# Patient Record
Sex: MALE | Race: WHITE | NOT HISPANIC OR LATINO | URBAN - NONMETROPOLITAN AREA
[De-identification: names, ages, dates, MRNs, and addresses within clinical notes are randomized per-mention and may not be internally consistent; named-entity substitution may affect disease eponyms.]

---

## 2017-10-06 ENCOUNTER — HISTORY (OUTPATIENT)
Dept: EMERGENCY MEDICINE | Facility: OTHER | Age: 77
End: 2017-10-06

## 2017-11-29 ENCOUNTER — HISTORY (OUTPATIENT)
Dept: MEDSURG UNIT | Facility: OTHER | Age: 77
End: 2017-11-29

## 2017-11-30 ENCOUNTER — TRANSFERRED RECORDS (OUTPATIENT)
Dept: HEALTH INFORMATION MANAGEMENT | Facility: CLINIC | Age: 77
End: 2017-11-30

## 2017-11-30 ENCOUNTER — MEDICAL CORRESPONDENCE (OUTPATIENT)
Facility: CLINIC | Age: 77
End: 2017-11-30
Payer: MEDICARE

## 2017-11-30 PROCEDURE — 93306 TTE W/DOPPLER COMPLETE: CPT | Mod: 26 | Performed by: INTERNAL MEDICINE

## 2017-12-01 ENCOUNTER — HISTORY (OUTPATIENT)
Dept: MEDSURG UNIT | Facility: OTHER | Age: 77
End: 2017-12-01

## 2017-12-02 ENCOUNTER — HISTORY (OUTPATIENT)
Dept: MEDSURG UNIT | Facility: OTHER | Age: 77
End: 2017-12-02

## 2017-12-03 ENCOUNTER — HISTORY (OUTPATIENT)
Dept: MEDSURG UNIT | Facility: OTHER | Age: 77
End: 2017-12-03

## 2017-12-06 ENCOUNTER — HISTORY (OUTPATIENT)
Dept: MEDSURG UNIT | Facility: OTHER | Age: 77
End: 2017-12-06

## 2017-12-08 ENCOUNTER — HISTORY (OUTPATIENT)
Dept: PEDIATRICS | Facility: OTHER | Age: 77
End: 2017-12-08

## 2017-12-08 ENCOUNTER — OFFICE VISIT - GICH (OUTPATIENT)
Dept: PEDIATRICS | Facility: OTHER | Age: 77
End: 2017-12-08

## 2017-12-08 DIAGNOSIS — N18.30 CHRONIC KIDNEY DISEASE, STAGE III (MODERATE) (H): ICD-10-CM

## 2017-12-08 DIAGNOSIS — I50.21 ACUTE SYSTOLIC HEART FAILURE (H): ICD-10-CM

## 2017-12-08 DIAGNOSIS — I50.20 SYSTOLIC CONGESTIVE HEART FAILURE (H): ICD-10-CM

## 2017-12-08 DIAGNOSIS — L03.116 CELLULITIS OF LEFT LOWER EXTREMITY: ICD-10-CM

## 2017-12-08 DIAGNOSIS — I48.19 PERSISTENT ATRIAL FIBRILLATION (H): ICD-10-CM

## 2017-12-08 DIAGNOSIS — M75.81 OTHER SHOULDER LESIONS, RIGHT SHOULDER: ICD-10-CM

## 2017-12-08 DIAGNOSIS — Z09 ENCOUNTER FOR FOLLOW-UP EXAMINATION AFTER COMPLETED TREATMENT FOR CONDITIONS OTHER THAN MALIGNANT NEOPLASM: ICD-10-CM

## 2017-12-14 ENCOUNTER — HOSPITAL ENCOUNTER (OUTPATIENT)
Dept: INFUSION THERAPY | Facility: OTHER | Age: 77
End: 2017-12-14
Attending: INTERNAL MEDICINE

## 2017-12-14 ENCOUNTER — HISTORY (OUTPATIENT)
Dept: INFUSION THERAPY | Facility: OTHER | Age: 77
End: 2017-12-14

## 2017-12-14 DIAGNOSIS — I48.19 PERSISTENT ATRIAL FIBRILLATION (H): ICD-10-CM

## 2017-12-14 DIAGNOSIS — I50.20 SYSTOLIC CONGESTIVE HEART FAILURE (H): ICD-10-CM

## 2017-12-14 LAB — POTASSIUM SERPL-SCNC: 4.5 MMOL/L (ref 3.5–5.1)

## 2017-12-19 ENCOUNTER — TRANSFERRED RECORDS (OUTPATIENT)
Dept: HEALTH INFORMATION MANAGEMENT | Facility: CLINIC | Age: 77
End: 2017-12-19

## 2017-12-19 ENCOUNTER — HISTORY (OUTPATIENT)
Dept: CARDIOLOGY | Facility: OTHER | Age: 77
End: 2017-12-19

## 2017-12-19 ENCOUNTER — OFFICE VISIT - GICH (OUTPATIENT)
Dept: CARDIOLOGY | Facility: OTHER | Age: 77
End: 2017-12-19

## 2017-12-19 ENCOUNTER — MEDICAL CORRESPONDENCE (OUTPATIENT)
Dept: CARDIOLOGY | Facility: CLINIC | Age: 77
End: 2017-12-19
Payer: MEDICARE

## 2017-12-19 DIAGNOSIS — I50.21 ACUTE SYSTOLIC HEART FAILURE (H): ICD-10-CM

## 2017-12-19 DIAGNOSIS — I45.10 RIGHT BUNDLE-BRANCH BLOCK: ICD-10-CM

## 2017-12-19 DIAGNOSIS — I50.20 SYSTOLIC CONGESTIVE HEART FAILURE (H): ICD-10-CM

## 2017-12-19 DIAGNOSIS — I48.19 PERSISTENT ATRIAL FIBRILLATION (H): ICD-10-CM

## 2017-12-19 DIAGNOSIS — N18.30 CHRONIC KIDNEY DISEASE, STAGE III (MODERATE) (H): ICD-10-CM

## 2017-12-19 DIAGNOSIS — I10 ESSENTIAL (PRIMARY) HYPERTENSION: ICD-10-CM

## 2017-12-19 DIAGNOSIS — I50.82 BIVENTRICULAR HEART FAILURE (H): ICD-10-CM

## 2017-12-19 DIAGNOSIS — D69.6 THROMBOCYTOPENIA (H): ICD-10-CM

## 2017-12-19 LAB — BNP SERPL-MCNC: 3356 PG/ML

## 2017-12-19 PROCEDURE — 99205 OFFICE O/P NEW HI 60 MIN: CPT | Mod: ZP | Performed by: INTERNAL MEDICINE

## 2017-12-26 ENCOUNTER — COMMUNICATION - GICH (OUTPATIENT)
Dept: CARDIAC REHAB | Facility: OTHER | Age: 77
End: 2017-12-26

## 2017-12-28 ENCOUNTER — COMMUNICATION - GICH (OUTPATIENT)
Dept: CARDIAC REHAB | Facility: OTHER | Age: 77
End: 2017-12-28

## 2017-12-28 ENCOUNTER — AMBULATORY - GICH (OUTPATIENT)
Dept: LAB | Facility: OTHER | Age: 77
End: 2017-12-28

## 2017-12-28 ENCOUNTER — HOSPITAL ENCOUNTER (OUTPATIENT)
Dept: RADIOLOGY | Facility: OTHER | Age: 77
End: 2017-12-28
Attending: INTERNAL MEDICINE

## 2017-12-28 DIAGNOSIS — D69.6 THROMBOCYTOPENIA (H): ICD-10-CM

## 2017-12-28 DIAGNOSIS — I48.19 PERSISTENT ATRIAL FIBRILLATION (H): ICD-10-CM

## 2017-12-28 DIAGNOSIS — I50.20 SYSTOLIC CONGESTIVE HEART FAILURE (H): ICD-10-CM

## 2017-12-28 DIAGNOSIS — N18.30 CHRONIC KIDNEY DISEASE, STAGE III (MODERATE) (H): ICD-10-CM

## 2017-12-28 LAB
ANION GAP - HISTORICAL: 7 (ref 5–18)
BNP SERPL-MCNC: 2389 PG/ML
BUN SERPL-MCNC: 24 MG/DL (ref 7–25)
BUN/CREAT RATIO - HISTORICAL: 23
CALCIUM SERPL-MCNC: 8.9 MG/DL (ref 8.6–10.3)
CHLORIDE SERPLBLD-SCNC: 107 MMOL/L (ref 98–107)
CO2 SERPL-SCNC: 24 MMOL/L (ref 21–31)
CREAT SERPL-MCNC: 1.05 MG/DL (ref 0.7–1.3)
ERYTHROCYTE [DISTWIDTH] IN BLOOD BY AUTOMATED COUNT: 18.8 % (ref 11.5–15.5)
GFR IF NOT AFRICAN AMERICAN - HISTORICAL: >60 ML/MIN/1.73M2
GLUCOSE SERPL-MCNC: 101 MG/DL (ref 70–105)
HCT VFR BLD AUTO: 39.2 % (ref 37–53)
HEMOGLOBIN: 12.5 G/DL (ref 13.5–17.5)
MCH RBC QN AUTO: 27.3 PG (ref 26–34)
MCHC RBC AUTO-ENTMCNC: 31.9 G/DL (ref 32–36)
MCV RBC AUTO: 86 FL (ref 80–100)
PLATELET # BLD AUTO: 115 THOU/CU MM (ref 140–440)
PMV BLD: 10.6 FL (ref 6.5–11)
POTASSIUM SERPL-SCNC: 4.5 MMOL/L (ref 3.5–5.1)
RED BLOOD COUNT - HISTORICAL: 4.58 MIL/CU MM (ref 4.3–5.9)
SODIUM SERPL-SCNC: 138 MMOL/L (ref 133–143)
WHITE BLOOD COUNT - HISTORICAL: 7.6 THOU/CU MM (ref 4.5–11)

## 2018-01-23 ENCOUNTER — HISTORY (OUTPATIENT)
Dept: CARDIOLOGY | Facility: OTHER | Age: 78
End: 2018-01-23

## 2018-01-23 ENCOUNTER — MEDICAL CORRESPONDENCE (OUTPATIENT)
Dept: CARDIOLOGY | Facility: CLINIC | Age: 78
End: 2018-01-23
Payer: MEDICARE

## 2018-01-23 ENCOUNTER — OFFICE VISIT - GICH (OUTPATIENT)
Dept: CARDIOLOGY | Facility: OTHER | Age: 78
End: 2018-01-23

## 2018-01-23 DIAGNOSIS — N18.30 CHRONIC KIDNEY DISEASE, STAGE III (MODERATE) (H): ICD-10-CM

## 2018-01-23 DIAGNOSIS — I10 ESSENTIAL (PRIMARY) HYPERTENSION: ICD-10-CM

## 2018-01-23 DIAGNOSIS — I45.10 RIGHT BUNDLE-BRANCH BLOCK: ICD-10-CM

## 2018-01-23 DIAGNOSIS — I50.21 ACUTE SYSTOLIC HEART FAILURE (H): ICD-10-CM

## 2018-01-23 DIAGNOSIS — I50.20 SYSTOLIC CONGESTIVE HEART FAILURE (H): ICD-10-CM

## 2018-01-23 DIAGNOSIS — I48.19 PERSISTENT ATRIAL FIBRILLATION (H): ICD-10-CM

## 2018-01-23 DIAGNOSIS — Z79.899 OTHER LONG TERM (CURRENT) DRUG THERAPY: ICD-10-CM

## 2018-01-23 DIAGNOSIS — I35.8 OTHER NONRHEUMATIC AORTIC VALVE DISORDERS: ICD-10-CM

## 2018-01-23 LAB
ALT (SGPT) - HISTORICAL: 4 IU/L (ref 7–52)
AST SERPL-CCNC: 17 IU/L (ref 13–39)
BNP SERPL-MCNC: 2374 PG/ML
TSH - HISTORICAL: 3.11 UIU/ML (ref 0.34–5.6)

## 2018-01-23 PROCEDURE — 99214 OFFICE O/P EST MOD 30 MIN: CPT | Mod: ZP | Performed by: INTERNAL MEDICINE

## 2018-01-25 ENCOUNTER — HOSPITAL ENCOUNTER (OUTPATIENT)
Dept: RESPIRATORY THERAPY | Facility: OTHER | Age: 78
End: 2018-01-25
Attending: INTERNAL MEDICINE

## 2018-01-25 DIAGNOSIS — Z79.899 OTHER LONG TERM (CURRENT) DRUG THERAPY: ICD-10-CM

## 2018-01-30 ENCOUNTER — HISTORY (OUTPATIENT)
Dept: INFUSION THERAPY | Facility: OTHER | Age: 78
End: 2018-01-30

## 2018-01-30 ENCOUNTER — HOSPITAL ENCOUNTER (OUTPATIENT)
Dept: INFUSION THERAPY | Facility: OTHER | Age: 78
End: 2018-01-30
Attending: INTERNAL MEDICINE

## 2018-01-30 LAB — POTASSIUM SERPL-SCNC: 4.2 MMOL/L (ref 3.5–5.1)

## 2018-02-09 VITALS
SYSTOLIC BLOOD PRESSURE: 100 MMHG | HEIGHT: 63 IN | TEMPERATURE: 98.3 F | DIASTOLIC BLOOD PRESSURE: 68 MMHG | HEART RATE: 90 BPM | WEIGHT: 145 LBS | BODY MASS INDEX: 25.69 KG/M2

## 2018-02-09 VITALS
WEIGHT: 149 LBS | HEART RATE: 72 BPM | SYSTOLIC BLOOD PRESSURE: 110 MMHG | DIASTOLIC BLOOD PRESSURE: 78 MMHG | DIASTOLIC BLOOD PRESSURE: 78 MMHG | WEIGHT: 137 LBS | BODY MASS INDEX: 25.21 KG/M2 | HEART RATE: 72 BPM | SYSTOLIC BLOOD PRESSURE: 128 MMHG | HEIGHT: 62 IN | BODY MASS INDEX: 27.42 KG/M2

## 2018-02-12 DIAGNOSIS — I48.91 ATRIAL FIBRILLATION, UNSPECIFIED TYPE (H): Primary | ICD-10-CM

## 2018-02-13 NOTE — PATIENT INSTRUCTIONS
Patient Information     Patient Name MRN Sex Toby lCay [id number] Male 1940      Patient Instructions by Ezra Arroyo MD at 2017  3:30 PM     Author:  Ezra Arroyo MD  Service:  (none) Author Type:  Physician     Filed:  2017  4:19 PM  Encounter Date:  2017 Status:  Addendum     :  Ezra Arroyo MD (Physician)        Related Notes: Original Note by Ezra Arroyo MD (Physician) filed at 2017  4:03 PM             -- Cardiology consult: Dr. López.   -- Daily weight   -- Call MD if weight increases by 3 lbs in one day or by 5 lb in a week   -- Elevate feet 4x/day above hip   -- Compression stockings AM to PM   -- Low salt diet   -- Limit fluid to 1500 mL/day   -- Bactrim x 10 days for left calf   -- Eat yogurt 1-2 times per day while on antibiotics (and for a few weeks after) to reduce the chances of diarrhea   -- Return in 7-10 days if rash is not resolved   -- Increase metoprolol to 25 mg   -- Schedule cardioversion   -- Schedule stress test   -- Have nursing call Ezra Arroyo MD with lightheaded/dizziness and would reduce lasix dose

## 2018-02-13 NOTE — TELEPHONE ENCOUNTER
Patient Information     Patient Name MRN Sex Toby Clay [id number] Male 1940      Telephone Encounter by Trista Hennessy RN at 2017  8:35 AM     Author:  Trista Hennessy RN Service:  (none) Author Type:  NURS- Registered Nurse     Filed:  2017  8:36 AM Encounter Date:  2017 Status:  Signed     :  Trista Hennessy RN (NURS- Registered Nurse)            Called to check on patient who had not showed up for stress test. No answer.

## 2018-02-13 NOTE — OR ANESTHESIA
Patient Information     Patient Name MRN Sex     Toby Meyer [id number] Male 1940      OR Anesthesia by Shonda Bravo CRNA at 2018  1:33 PM     Author:  Shonda Bravo CRNA Service:  (none) Author Type:  NURS- Nurse Anesthetist     Filed:  2018  1:33 PM Date of Service:  2018  1:33 PM Status:  Signed     :  Shonda Bravo CRNA (NURS- Nurse Anesthetist)            Anesthesia Post Operative Care Note    Name: Toby Meyer  MRN:   [id number]  :    1940       Procedure Done:  See Surgeon Note        Anesthesia Technique    Anesthetic Type:  MAC       MAC Type:  NC     Oral Trauma:  No    Intraoperative Course   Hemodynamics:  Stable    Ventilation Normal:  Yes Lung Sounds:  Normal      PACU Course        Nondepolarizer Used:       Reversed: N/A   Hemodynamics:  Stable      Hydration: Euvolemic   Temperature:  36.1 - 38.3      Mental Status:  Awake, alert, follows commands   Pain Management:  Adequate   Regional Block:  No   Anesthesia Complications:  None      Vital Signs:  Temp: 97.4  F (36.3  C)  Pulse: (!) 43  BP: (!) 88/57  Resp: 16  SpO2: 100 %    O2 Device: Room Air                  Active Lines:  Patient Lines/Drains/Airways Status    Active Line     Name: Placement date: Placement time: Site: Days:    PERIPHERAL VAD Right Antecubital 18 18   1300   Antecubital   less than 1                Intake & Output:       Labs:  No results for input(s): ZE1HAWGKEPA, HSM5FXRHDWHJ, PHARTERIAL, LBO8IFLSIUET, H6GQZRXZLXMH in the last 24 hours.    No results for input(s): MAGNESIUM in the last 24 hours.    No results for input(s): GLUCOSEMETER in the last 720 hours.        Shonda Bravo CRNA ....................  2018   1:33 PM

## 2018-02-13 NOTE — PATIENT INSTRUCTIONS
Patient Information     Patient Name MRN Sex Toby Clya [id number] Male 1940      Patient Instructions by Nayeli Villarreal RN at 2017  2:45 PM     Author:  Nayeli Villarreal RN Service:  (none) Author Type:  NURS- Registered Nurse     Filed:  2017  4:00 PM Encounter Date:  2017 Status:  Signed     :  Nayeli Villarreal RN (NURS- Registered Nurse)            Please weigh yourself every morning.  Keep a log of these weights.  Call if you gain more than 2 pounds in one day or 5 pounds in one week, have increased shortness of breath, or have increased swelling in your legs, feet, ankles, or belly.  [phone]    Start and maintain a low salt diet.  Keep your salt intake at 2,000 mg to 3,000 mg per day.    Limit fluid intake to no more than 1.5 liters per day    Increase lisinopril to 5 mg daily    Increase Lasix to 40 mg daily    Complete stress next next week    Repeat labs 1 -2 weeks    Please follow-up with cardiology in 1 month

## 2018-02-13 NOTE — OR ANESTHESIA
Patient Information     Patient Name MRN Sex Toby Clay [id number] Male 1940      OR Anesthesia by Shonda Bravo CRNA at 2018 12:53 PM     Author:  Shonda Bravo CRNA Service:  (none) Author Type:  NURS- Nurse Anesthetist     Filed:  2018 12:53 PM Date of Service:  2018 12:53 PM Status:  Signed     :  Shonda Bravo CRNA (NURS- Nurse Anesthetist)                                                           ANESTHESIA ASSESSMENT    Date: 18 Time: 12:53 PM      Patient:  Toby Meyer    * No surgery found *    Past Medical History:     Diagnosis  Date     Anasarca 2017     Aortic valve sclerosis 6/15/2017    S/p TTE 6/15/2017 [facility]       Ascites 2017    Moderate s/p Renal US       Atrial fibrillation (HC)     chronic anticoag with xarelto      Biventricular heart failure 6/15/2017    S/p TTE 6/15/2017: LVEF 25 % with diffuse hypokinesis; global RV fxn moderately reduced      CKD (chronic kidney disease) stage 3, GFR 30-59 ml/min      Essential hypertension 2013     Pneumonia 2013     RBBB (right bundle branch block) 2017     Systolic CHF, acute (HC) 2013    [facility] EF 20-25%       Systolic CHF, acute (HC) 2017    hosp'd [facility]      Systolic heart failure (HC)     EF 20-25% echo 13 at [facility]      [facility] (HC) 2017     Total bilirubin, elevated 2017       Past Surgical History:      Procedure  Laterality Date     CARDIOVERSION  13    successful       CARDIOVERSION  2017            SKIN BIOPSY  2013    nose         Family History      Problem  Relation Age of Onset     Good Health Mother      Good Health Father      Good Health Maternal Aunt      Good Health Paternal Aunt      Good Health Paternal Uncle      Good Health Maternal Uncle        Patient Active Problem List     Diagnosis  Code     VITALY (acute kidney injury)  (HC) N17.9     Systolic CHF, acute (HC) I50.21     Advance care planning Z71.89     Aortic valve sclerosis I35.8     Biventricular heart failure I50.82     Ascites R18.8     Hypermagnesemia E83.41     Total bilirubin, elevated R17     Thrombocytopenia (HC) D69.6     Elevated troponin I level R74.8     RBBB (right bundle branch block) I45.10     Bruising T14.8XXA     Anasarca R60.1     Atrial fibrillation (HC) I48.91     Mitral valve regurgitation I34.0     Tricuspid valve regurgitation I07.1     CKD (chronic kidney disease) stage 3, GFR 30-59 ml/min N18.3     HTN (hypertension) I10     Severe systolic congestive heart failure (HC) I50.20     On amiodarone therapy Z79.899         (Not in a hospital admission)    Allergies:No Known Allergies    Review of Systems:  GERD: No  Chest pain: No  Shortness of breath: No  Recent fever: No  Poor exercise tolerance: No  Bleeding tendency: Yes (on xarelto)  Pregnant: No  Anesthesia Complications: None      History    Smoking Status      Never Smoker   Smokeless Tobacco      Never Used     Social History     Social History        Marital status:       Spouse name: N/A     Number of children:  N/A     Years of education:  N/A     Social History Main Topics        Smoking status:  Never Smoker     Smokeless tobacco:  Never Used     Alcohol use  No     Drug use:  No     Sexual activity:  Yes     Partners: Female     Other Topics  Concern     Not on file      Social History Narrative     Born in [place], has been in the  for many years.  Jordanian is his first language.  , Sara.  7 sons and 2 daughters.  Moved to [place], [place] with his wife in 9/2017 to be closer to his daughter, Ja.    Moved from [place], [place] after 6 years and prior to that lived in [place], [place] for 6 years and prior to that [place].  Retired from construction.             Physical Examination:  /79  Pulse 75  Temp 96.4  F (35.8  C)  Resp 18  SpO2 97% There is no height or  weight on file to calculate BMI. There is no height or weight on file to calculate BSA.  Dental Condition: Good (upper denture in place)     Mallampati Score (Airway): II  Cardiovascular: Abnormal  (CHF, A-fib)  Pulmonary: Normal  Other: (not recorded)    Recent Labs in [facility]:    No results for input(s): SODIUM, POTASSIUM, CHLORIDE, IL8MJJQQ, ANIONGAP, BUN, CREATININE, BUNCREARATIO, CALCIUM, GLUCOSE, GLUCOSEMETER, KETONES, MAGNESIUM, WBC, HGB, HCT, PLT, ABORH, RHTYPE, PREGURINE, BHCGQL, HCGBETAQUANT, INR in the last 72 hours.          Assessment/Plan:  ASA Class: III  Risk of dental injury discussed: Yes  NPO status confirmed: Yes  Anesthetic Plan: MAC  Risk/Benefit/Alt discussed: Yes  Questions answered: Yes  Emergency Case?: No  Labs/ECG/Radiology Reviewed?: Yes      H&P Reviewed.  Patient Examined.      Provider Electronic Signature:  Shonda Bravo CRNA

## 2018-02-13 NOTE — H&P
Patient Information     Patient Name MRN Sex Toby Clay [id number] Male 1940      H&P by Ezra Arroyo MD at 2018  1:15 PM     Author:  Ezra Arroyo MD Service:  (none) Author Type:  Physician     Filed:  2018  1:15 PM Date of Service:  2018  1:15 PM Status:  Signed     :  Ezra Arroyo MD (Physician)            H&P Update:  I have personally reviewed the note from Dr. López from 18.  There have been no intervening changes.  He has been therapeutic on xarelto for > 3 weeks.    Problem list and home medication list reviewed today.    /82  Pulse 75  Temp 96.4  F (35.8  C)  Resp 18  SpO2 97%  Gen: Calm  CV: irregularly irregular, no m/r/g  Pulm: CTAB, no w/r/r  Neuro: A&Ox3    EKG  2018  Study personally reviewed  Atrial fibrillation, rate 75    POTASSIUM (mmol/L)    Date Value   2018 4.2       Assessment:  Atrial fibrillation    Plan:   -- Proceed with synchronized cardioversion    Signed, Ezra Arroyo MD  Internal Medicine & Pediatrics  Pager: [phone]

## 2018-02-13 NOTE — NURSING NOTE
Patient Information     Patient Name MRN Sex Toby Clay [id number] Male 1940      Nursing Note by Xena Morris at 2017  2:45 PM     Author:  Xena Morris Service:  (none) Author Type:  (none)     Filed:  2017  3:01 PM Encounter Date:  2017 Status:  Signed     :  Xena Morris            Patient comes in for consult on hospital follow up and acute systolic CHF.  Xena Morris LPN ....................  2017   2:57 PM

## 2018-02-13 NOTE — OR ANESTHESIA
Patient Information     Patient Name MRN Sex     Toyb Meyer [id number] Male 1940      OR Anesthesia by Wei Rios CRNA at 2017  1:30 PM     Author:  Wei Rios CRNA Service:  (none) Author Type:  NURS- Nurse Anesthetist     Filed:  2017  1:31 PM Date of Service:  2017  1:30 PM Status:  Signed     :  Wei Rios CRNA (NURS- Nurse Anesthetist)            Anesthesia Post Operative Care Note    Name: Toby Meyer  MRN:   [id number]  :    1940       Procedure Done:  See Surgeon Note   Case Cancelled for Anesthetic Reason:  No      Anesthesia Technique    Anesthetic Type:  MAC       MAC Type:  NC     Oral Trauma:  No    Intraoperative Course   Hemodynamics:  Stable    Ventilation Normal:  Yes Lung Sounds:  Normal      PACU Course        Nondepolarizer Used:       Reversed: N/A   Hemodynamics:  Stable      Hydration: Euvolemic   Temperature:  36.1 - 38.3      Mental Status:  Awake, alert, follows commands   Pain Management:  Adequate   Regional Block:  No   Anesthesia Complications:  None      Vital Signs:  Temp: 99.8  F (37.7  C)  Pulse: (!) 129  BP: (!) 119/111  Resp: 20  SpO2: 99 %    O2 Device: Room Air                  Active Lines:  Patient Lines/Drains/Airways Status    Active Line     Name: Placement date: Placement time: Site: Days:    PERIPHERAL VAD Right;Antecubital 20 13   0907      1637    PERIPHERAL VAD Left Hand 20 17   1244   Hand   less than 1                Intake & Output:       Labs:  No results for input(s): SY4MNBXOGOM, TBR6MGBZOCEB, PHARTERIAL, AFZ3PHIRJFVT, S2LHCAMJCOZN in the last 24 hours.    No results for input(s): MAGNESIUM in the last 24 hours.    No results for input(s): GLUCOSEMETER in the last 720 hours.        Wei Rios CRNA ....................  2017   1:30 PM

## 2018-02-13 NOTE — PROGRESS NOTES
Patient Information     Patient Name MRN Sex Toby Clay [id number] Male 1940      Progress Notes by Dylan López DO at 2017  2:45 PM     Author:  Dylan López DO Service:  (none) Author Type:  PHYS- Osteopathic     Filed:  2017  7:32 PM Encounter Date:  2017 Status:  Signed     :  Dylan López DO (PHYS- Osteopathic)            [facility] HEART CARE   CARDIOLOGY CONSULT    Toby Meyer    Ezra Arroyo MD    Chief Complaint     Patient presents with       Consult      hospital f/u, acute systolic CHF         HPI:      IMAGING RESULTS:  Mr. Meyer is a 77-year-old Eritrean/Malay gentleman who speaks partial Malay and at the times is difficult to understand. He is being seen for severe systolic heart failure. In addition, he has a history of atrial fibrillation, biventricular heart failure, chronic kidney disease stage III, hypertension, right bundle branch block, and anasarca.    He's had atrial fibrillation since approximately  but is unsure of the dates. He states he was on Coumadin for approximately one year but then was transitioned to Xarelto 15 mg daily with a creatinine clearance which is less than 50.  His creatinine clearance was 49 based on his last labs from 17 and 44 based on labs from 17. He has lived in multiple places including [place], [place], and [place]. He reports his dad was the  of [employer] but was killed/murdered. He has no ties to the company presently, if I understand him right. He moved to [place] as his significant other of 27 years combined daughter lives in [place].     He has had increasing swelling to his lower extremities over the last 3 months. The swelling is described as starting in his feet extending up his legs and into his abdomen. He was admitted to the hospital on 17 for an acute exacerbation of systolic heart failure. He had seen cardiology   2017 and was found to have an ejection fraction of 25% secondary to atrial fibrillation. He has been cardioverted in the past but was described as successful only for a short period of time.    He was originally seen by [facility] for shortness of breath. He did establish care with them on November 27, 2017 he was seen on November 28 and November 29. He was having a hard time laying flat in bed secondary shortness of breath. The swelling extended into his abdomen. He was transferred from [place] for an admission to [facility]. He was admitted to the hospital after initially trying torsemide and provided IV Lasix. He had minimal urine output. He is transitioning to furosemide and metolazone at which point an he started to diurese. He diuresed a total of 15 L and was down 20 pounds. His kidney function improved from 3.06 to 1.2. He was noted to be in atrial fibrillation. He has been at [facility] for physical therapy since discharge and per the patient, has plans for discharge tomorrow, 12/20/17.    He has since follow up with Dr. Arroyo and had a cardioversion on 12/14/17. His EKG today to continues to support sinus rhythm. It seems that his reduced EF is secondary to rate. He has been on Lasix 20 mg daily, lisinopril 2.5 mg daily, metoprolol 25 mg daily, and potassium 20 mEq.    He has an echo from 11/30/17 which shows severe hypokinesis, an EF of 5-10%, moderate mitral regurgitation, and moderate tricuspid regurgitation. His IVC was dilated at 2.6 cm and he was noted to be in atrial fibrillation. He has severe bilateral atrial enlargement. He had moderate to severe right ventricular hypertrophy.          PAST MEDICAL HISTORY:  Past Medical History:     Diagnosis  Date     Anasarca 11/29/2017     Aortic valve sclerosis 6/15/2017    S/p TTE 6/15/2017 [facility]       Ascites 11/29/2017    Moderate s/p Renal US       Atrial fibrillation (HC) 2013    chronic anticoag with xarelto      Biventricular  heart failure 6/15/2017    S/p TTE 6/15/2017: LVEF 25 % with diffuse hypokinesis; global RV fxn moderately reduced      CKD (chronic kidney disease) stage 3, GFR 30-59 ml/min 2013     Essential hypertension 2013     Pneumonia 4/21/2013     RBBB (right bundle branch block) 11/29/2017     Systolic CHF, acute (HC) 4/22/2013    [facility] heart and vascular [place] EF 20-25%       Systolic CHF, acute (HC) 11/29/2017    hosp'd GI      Systolic heart failure (HC) 2013    EF 20-25% echo 4/12/13 at [facility] (HC) 11/29/2017     Total bilirubin, elevated 11/29/2017       FAMILY HISTORY:  Family History      Problem  Relation Age of Onset     Good Health Mother      Good Health Father      Good Health Maternal Aunt      Good Health Paternal Aunt      Good Health Paternal Uncle      Good Health Maternal Uncle        PAST SURGICAL HISTORY:  Past Surgical History:      Procedure  Laterality Date     CARDIOVERSION  6/21/13    successful       CARDIOVERSION  12/14/2017            SKIN BIOPSY  9/2013    nose         SOCIAL HISTORY:  Social History     Social History        Marital status:       Spouse name: N/A     Number of children:  N/A     Years of education:  N/A     Social History Main Topics        Smoking status:  Never Smoker     Smokeless tobacco:  Never Used     Alcohol use  No     Drug use:  No     Sexual activity:  Yes     Partners: Female     Other Topics  Concern     None      Social History Narrative     Born in [place], has been in the [place] for many years.  Emirati is his first language.  , Asra.  7 sons and 2 daughters.  Moved to [place], [place] with his wife in 9/2017 to be closer to his daughter, Ja.    Moved from [place], [place] after 6 years and prior to that lived in [place], [place] for 6 years and prior to that [place].  Retired from construction.             CURRENT MEDICATIONS:  Current Outpatient Prescriptions on File Prior to Visit      "  Medication  Sig Dispense Refill     DME Compression stockings. Knee high. 20 mmHg. CHF. Lifelong. 2 Each 11     metoprolol succinate (TOPROL XL) 25 mg Sustained-Release tablet Take 1 tablet by mouth once daily. 90 tablet 4     multivitamin (MVI) tablet Take 1 tablet by mouth once daily. Indications: VITAMIN DEFICIENCY PREVENTION       polyethylene glycol (MIRALAX; GLYCOLAX) 17 g powder for solution Take 17 g by mouth once daily if needed for Constipation. 1 box 0     potassium chloride (K-DUR) 20 mEq Extended-Release tablet Take 1 tablet by mouth once daily with a meal. 30 tablet 11     rivaroxaban (XARELTO) 15 mg tab tablet Take 15 mg by mouth once daily with evening meal.       sennosides-docusate, 8.6-50 mg, (SENOKOT S) 8.6-50 mg tablet Take 1 tablet by mouth 2 times daily. 60 tablet 11     No current facility-administered medications on file prior to visit.        ALLERGIES:  No Known Allergies      ROS:  CONSTITUTIONAL:  No weight loss but with weight gain, fever, chills, he denies weakness or fatigue.  HEENT:  Eyes:  No visual changes. Ears, Nose, Throat:  No hearing loss, congestion or difficulty swallowing.   CARDIOVASCULAR:  No chest pain, chest pressure or chest discomfort. No palpitations but with improved but still moderate lower extremity edema.  RESPIRATORY:  He admits to shortness of breath with dyspnea upon exertion, but he denies a cough or sputum production.  GASTROINTESTINAL: No abdominal pain. No anorexia, nausea, vomiting or diarrhea.   NEUROLOGICAL:  No headache, lightheadedness, dizziness, syncope, ataxia or weakness.  HEMATOLOGIC:  No anemia, bleeding or bruising.  PSYCHIATRIC:  No history of depression or anxiety.  ENDOCRINOLOGIC:  No reports of sweating, cold or heat intolerance. No polyuria or polydipsia.  SKIN:  No abnormal rashes or itching.      PHYSICAL EXAM:  /78 (Cuff Site: Right Arm, Position: Sitting, Cuff Size: Adult Regular)  Pulse 72  Ht 1.57 m (5' 1.81\")  Wt 67.6 kg " (149 lb)  BMI 27.42 kg/m2  GENERAL: The patient is a well-developed, well-nourished, in no apparent distress. Alert and oriented x3.  HEENT: Head is normocephalic and atraumatic.   HEART: Regular rate and rhythm, S1S2 present without murmur, rub or gallop.  LUNGS: Respirations regular and unlabored.   GI: Abdomen is soft and nondistended. Minimally full abdomen  EXTREMITIES: 2/4 peripheral edema present.   MUSCULOSKELETAL: No joint swelling.  NEUROLOGIC: Alert and oriented X3. No focal neurologic deficits.   SKIN: No jaundice. No rashes or visible skin lesions present.      LAB RESULTS:  Hospital Outpatient Visit on 12/14/2017        Component  Date Value Ref Range Status     POTASSIUM 12/14/2017 4.5  3.5 - 5.1 mmol/L Final   Admission on 11/29/2017, Discharged on 12/07/2017        Component  Date Value Ref Range Status     SODIUM 11/29/2017 133  133 - 143 mmol/L Final     POTASSIUM 11/29/2017 4.0  3.5 - 5.1 mmol/L Final     CHLORIDE 11/29/2017 96* 98 - 107 mmol/L Final     CO2,TOTAL 11/29/2017 23  21 - 31 mmol/L Final     ANION GAP 11/29/2017 14  5 - 18                 Final     GLUCOSE 11/29/2017 102  70 - 105 mg/dL Final     CALCIUM 11/29/2017 9.3  8.6 - 10.3 mg/dL Final     BUN 11/29/2017 72* 7 - 25 mg/dL Final     CREATININE 11/29/2017 2.83* 0.70 - 1.30 mg/dL Final     BUN/CREAT RATIO           11/29/2017 25                  Final     GFR if  11/29/2017 26* >60 ml/min/1.73m2 Final     GFR if not  11/29/2017 22* >60 ml/min/1.73m2 Final     WHITE BLOOD COUNT         11/29/2017 9.3  4.5 - 11.0 thou/cu mm Final     RED BLOOD COUNT           11/29/2017 5.73  4.30 - 5.90 mil/cu mm Final     HEMOGLOBIN                11/29/2017 16.5  13.5 - 17.5 g/dL Final     HEMATOCRIT                11/29/2017 49.9  37.0 - 53.0 % Final     MCV                       11/29/2017 87  80 - 100 fL Final     MCH                       11/29/2017 28.8  26.0 - 34.0 pg Final     [facility]                       11/29/2017 33.1  32.0 - 36.0 g/dL Final     RDW                       11/29/2017 17.8* 11.5 - 15.5 % Final     PLATELET COUNT            11/29/2017 135* 140 - 440 thou/cu mm Final     MPV                       11/29/2017 13.1* 6.5 - 11.0 fL Final     MAGNESIUM 11/29/2017 2.9* 1.9 - 2.7 mg/dL Final     TROPONIN I 11/29/2017 0.071* <0.034 ng/mL Final     WHITE BLOOD COUNT         11/30/2017 8.0  4.5 - 11.0 thou/cu mm Final     RED BLOOD COUNT           11/30/2017 5.36  4.30 - 5.90 mil/cu mm Final     HEMOGLOBIN                11/30/2017 15.2  13.5 - 17.5 g/dL Final     HEMATOCRIT                11/30/2017 46.1  37.0 - 53.0 % Final     MCV                       11/30/2017 86  80 - 100 fL Final     MCH                       11/30/2017 28.4  26.0 - 34.0 pg Final     MCHC                      11/30/2017 33.0  32.0 - 36.0 g/dL Final     RDW                       11/30/2017 17.2* 11.5 - 15.5 % Final     PLATELET COUNT            11/30/2017 126* 140 - 440 thou/cu mm Final     MPV                       11/30/2017 13.4* 6.5 - 11.0 fL Final     SODIUM 11/30/2017 135  133 - 143 mmol/L Final     POTASSIUM 11/30/2017 4.4  3.5 - 5.1 mmol/L Final     CHLORIDE 11/30/2017 95* 98 - 107 mmol/L Final     CO2,TOTAL 11/30/2017 22  21 - 31 mmol/L Final     ANION GAP 11/30/2017 18  5 - 18                 Final     GLUCOSE 11/30/2017 79  70 - 105 mg/dL Final     CALCIUM 11/30/2017 8.9  8.6 - 10.3 mg/dL Final     BUN 11/30/2017 78* 7 - 25 mg/dL Final     CREATININE 11/30/2017 2.94* 0.70 - 1.30 mg/dL Final     BUN/CREAT RATIO           11/30/2017 27                  Final     GFR if  11/30/2017 25* >60 ml/min/1.73m2 Final     GFR if not  11/30/2017 21* >60 ml/min/1.73m2 Final     ALBUMIN 11/30/2017 3.4* 3.5 - 5.7 g/dL Final     PROTEIN,TOTAL 11/30/2017 5.3* 6.4 - 8.9 g/dL Final     GLOBULIN                  11/30/2017 1.9* 2.0 - 3.7 g/dL Final     A/G RATIO 11/30/2017 1.8  1.0 - 2.0                 Final      BILIRUBIN,TOTAL 11/30/2017 2.8* 0.3 - 1.0 mg/dL Final     ALK PHOSPHATASE 11/30/2017 89  34 - 104 IU/L Final     ALT (SGPT) 11/30/2017 7  7 - 52 IU/L Final     AST (SGOT) 11/30/2017 21  13 - 39 IU/L Final     TROPONIN I 11/30/2017 0.059* <0.034 ng/mL Final     MAGNESIUM 11/30/2017 2.8* 1.9 - 2.7 mg/dL Final     WHITE BLOOD COUNT         12/01/2017 8.7  4.5 - 11.0 thou/cu mm Final     RED BLOOD COUNT           12/01/2017 5.43  4.30 - 5.90 mil/cu mm Final     HEMOGLOBIN                12/01/2017 15.1  13.5 - 17.5 g/dL Final     HEMATOCRIT                12/01/2017 46.2  37.0 - 53.0 % Final     MCV                       12/01/2017 85  80 - 100 fL Final     MCH                       12/01/2017 27.8  26.0 - 34.0 pg Final     MCHC                      12/01/2017 32.7  32.0 - 36.0 g/dL Final     RDW                       12/01/2017 17.7* 11.5 - 15.5 % Final     PLATELET COUNT            12/01/2017 128* 140 - 440 thou/cu mm Final     MPV                       12/01/2017 13.5* 6.5 - 11.0 fL Final     SODIUM 12/01/2017 132* 133 - 143 mmol/L Final     POTASSIUM 12/01/2017 3.7  3.5 - 5.1 mmol/L Final     CHLORIDE 12/01/2017 92* 98 - 107 mmol/L Final     CO2,TOTAL 12/01/2017 23  21 - 31 mmol/L Final     ANION GAP 12/01/2017 17  5 - 18                 Final     GLUCOSE 12/01/2017 121* 70 - 105 mg/dL Final     CALCIUM 12/01/2017 8.6  8.6 - 10.3 mg/dL Final     BUN 12/01/2017 88* 7 - 25 mg/dL Final     CREATININE 12/01/2017 3.06* 0.70 - 1.30 mg/dL Final     BUN/CREAT RATIO           12/01/2017 29                  Final     GFR if  12/01/2017 24* >60 ml/min/1.73m2 Final     GFR if not  12/01/2017 20* >60 ml/min/1.73m2 Final     WHITE BLOOD COUNT         12/02/2017 8.3  4.5 - 11.0 thou/cu mm Final     RED BLOOD COUNT           12/02/2017 5.11  4.30 - 5.90 mil/cu mm Final     HEMOGLOBIN                12/02/2017 14.6  13.5 - 17.5 g/dL Final     HEMATOCRIT                12/02/2017 43.2  37.0 - 53.0 %  Final     MCV                       12/02/2017 85  80 - 100 fL Final     MCH                       12/02/2017 28.6  26.0 - 34.0 pg Final     MCHC                      12/02/2017 33.8  32.0 - 36.0 g/dL Final     RDW                       12/02/2017 17.1* 11.5 - 15.5 % Final     PLATELET COUNT            12/02/2017 114* 140 - 440 thou/cu mm Final     MPV                       12/02/2017 13.0* 6.5 - 11.0 fL Final     SODIUM 12/02/2017 136  133 - 143 mmol/L Final     POTASSIUM 12/02/2017 3.0* 3.5 - 5.1 mmol/L Final     CHLORIDE 12/02/2017 92* 98 - 107 mmol/L Final     CO2,TOTAL 12/02/2017 29  21 - 31 mmol/L Final     ANION GAP 12/02/2017 15  5 - 18                 Final     GLUCOSE 12/02/2017 123* 70 - 105 mg/dL Final     CALCIUM 12/02/2017 8.7  8.6 - 10.3 mg/dL Final     BUN 12/02/2017 90* 7 - 25 mg/dL Final     CREATININE 12/02/2017 2.74* 0.70 - 1.30 mg/dL Final     BUN/CREAT RATIO           12/02/2017 33                  Final     GFR if  12/02/2017 27* >60 ml/min/1.73m2 Final     GFR if not  12/02/2017 23* >60 ml/min/1.73m2 Final     MAGNESIUM 12/02/2017 2.6  1.9 - 2.7 mg/dL Final     TROPONIN I 12/02/2017 0.065* <0.034 ng/mL Final     MAGNESIUM 12/03/2017 2.4  1.9 - 2.7 mg/dL Final     SODIUM 12/03/2017 141  133 - 143 mmol/L Final     POTASSIUM 12/03/2017 2.7* 3.5 - 5.1 mmol/L Final     CHLORIDE 12/03/2017 92* 98 - 107 mmol/L Final     CO2,TOTAL 12/03/2017 33* 21 - 31 mmol/L Final     ANION GAP 12/03/2017 16  5 - 18                 Final     GLUCOSE 12/03/2017 109* 70 - 105 mg/dL Final     CALCIUM 12/03/2017 8.7  8.6 - 10.3 mg/dL Final     BUN 12/03/2017 81* 7 - 25 mg/dL Final     CREATININE 12/03/2017 2.31* 0.70 - 1.30 mg/dL Final     BUN/CREAT RATIO           12/03/2017 35                  Final     GFR if  12/03/2017 33* >60 ml/min/1.73m2 Final     GFR if not  12/03/2017 28* >60 ml/min/1.73m2 Final     SODIUM 12/04/2017 135  133 - 143 mmol/L Final      POTASSIUM 12/04/2017 2.7* 3.5 - 5.1 mmol/L Final     CHLORIDE 12/04/2017 88* 98 - 107 mmol/L Final     CO2,TOTAL 12/04/2017 33* 21 - 31 mmol/L Final     ANION GAP 12/04/2017 14  5 - 18                 Final     GLUCOSE 12/04/2017 223* 70 - 105 mg/dL Final     CALCIUM 12/04/2017 8.1* 8.6 - 10.3 mg/dL Final     BUN 12/04/2017 72* 7 - 25 mg/dL Final     CREATININE 12/04/2017 1.99* 0.70 - 1.30 mg/dL Final     BUN/CREAT RATIO           12/04/2017 36                  Final     GFR if  12/04/2017 40* >60 ml/min/1.73m2 Final     GFR if not  12/04/2017 33* >60 ml/min/1.73m2 Final     POTASSIUM 12/04/2017 3.3* 3.5 - 5.1 mmol/L Final     POTASSIUM 12/05/2017 3.2* 3.5 - 5.1 mmol/L Final     SODIUM 12/05/2017 141  133 - 143 mmol/L Final     POTASSIUM 12/05/2017 3.3* 3.5 - 5.1 mmol/L Final     CHLORIDE 12/05/2017 93* 98 - 107 mmol/L Final     CO2,TOTAL 12/05/2017 33* 21 - 31 mmol/L Final     ANION GAP 12/05/2017 15  5 - 18                 Final     GLUCOSE 12/05/2017 121* 70 - 105 mg/dL Final     CALCIUM 12/05/2017 8.2* 8.6 - 10.3 mg/dL Final     BUN 12/05/2017 60* 7 - 25 mg/dL Final     CREATININE 12/05/2017 1.57* 0.70 - 1.30 mg/dL Final     BUN/CREAT RATIO           12/05/2017 38                  Final     GFR if  12/05/2017 52* >60 ml/min/1.73m2 Final     GFR if not  12/05/2017 43* >60 ml/min/1.73m2 Final     WHITE BLOOD COUNT         12/06/2017 11.0  4.5 - 11.0 thou/cu mm Final     RED BLOOD COUNT           12/06/2017 4.94  4.30 - 5.90 mil/cu mm Final     HEMOGLOBIN                12/06/2017 14.0  13.5 - 17.5 g/dL Final     HEMATOCRIT                12/06/2017 42.7  37.0 - 53.0 % Final     MCV                       12/06/2017 86  80 - 100 fL Final     MCH                       12/06/2017 28.3  26.0 - 34.0 pg Final     MCHC                      12/06/2017 32.8  32.0 - 36.0 g/dL Final     RDW                       12/06/2017 16.9* 11.5 - 15.5 % Final      PLATELET COUNT            12/06/2017 108* 140 - 440 thou/cu mm Final     MPV                       12/06/2017 12.7* 6.5 - 11.0 fL Final     SODIUM 12/06/2017 135  133 - 143 mmol/L Final     POTASSIUM 12/06/2017 3.3* 3.5 - 5.1 mmol/L Final     CHLORIDE 12/06/2017 96* 98 - 107 mmol/L Final     CO2,TOTAL 12/06/2017 31  21 - 31 mmol/L Final     ANION GAP 12/06/2017 8  5 - 18                 Final     GLUCOSE 12/06/2017 100  70 - 105 mg/dL Final     CALCIUM 12/06/2017 7.6* 8.6 - 10.3 mg/dL Final     BUN 12/06/2017 52* 7 - 25 mg/dL Final     CREATININE 12/06/2017 1.35* 0.70 - 1.30 mg/dL Final     BUN/CREAT RATIO           12/06/2017 39                  Final     GFR if  12/06/2017 >60  >60 ml/min/1.73m2 Final     GFR if not  12/06/2017 51* >60 ml/min/1.73m2 Final     MAGNESIUM 12/06/2017 2.2  1.9 - 2.7 mg/dL Final     SODIUM 12/07/2017 134  133 - 143 mmol/L Final     POTASSIUM 12/07/2017 3.4* 3.5 - 5.1 mmol/L Final     CHLORIDE 12/07/2017 92* 98 - 107 mmol/L Final     CO2,TOTAL 12/07/2017 29  21 - 31 mmol/L Final     ANION GAP 12/07/2017 13  5 - 18                 Final     GLUCOSE 12/07/2017 106* 70 - 105 mg/dL Final     CALCIUM 12/07/2017 7.9* 8.6 - 10.3 mg/dL Final     BUN 12/07/2017 42* 7 - 25 mg/dL Final     CREATININE 12/07/2017 1.20  0.70 - 1.30 mg/dL Final     BUN/CREAT RATIO           12/07/2017 35                  Final     GFR if  12/07/2017 >60  >60 ml/min/1.73m2 Final     GFR if not  12/07/2017 59* >60 ml/min/1.73m2 Final     MAGNESIUM 12/07/2017 2.4  1.9 - 2.7 mg/dL Final   Admission on 10/06/2017, Discharged on 10/06/2017        Component  Date Value Ref Range Status     TROPONIN I 10/06/2017 0.043* <0.034 ng/mL Final     SODIUM 10/06/2017 134  133 - 143 mmol/L Final     POTASSIUM 10/06/2017 3.6  3.5 - 5.1 mmol/L Final     CHLORIDE 10/06/2017 97* 98 - 107 mmol/L Final     CO2,TOTAL 10/06/2017 22  21 - 31 mmol/L Final     ANION GAP 10/06/2017 15   5 - 18                 Final     GLUCOSE 10/06/2017 76  70 - 105 mg/dL Final     CALCIUM 10/06/2017 9.5  8.6 - 10.3 mg/dL Final     BUN 10/06/2017 47* 7 - 25 mg/dL Final     CREATININE 10/06/2017 1.83* 0.70 - 1.30 mg/dL Final     BUN/CREAT RATIO           10/06/2017 26                  Final     GFR if  10/06/2017 44* >60 ml/min/1.73m2 Final     GFR if not  10/06/2017 36* >60 ml/min/1.73m2 Final     ALBUMIN 10/06/2017 4.0  3.5 - 5.7 g/dL Final     PROTEIN,TOTAL 10/06/2017 7.1  6.4 - 8.9 g/dL Final     GLOBULIN                  10/06/2017 3.1  2.0 - 3.7 g/dL Final     A/G RATIO 10/06/2017 1.3  1.0 - 2.0                 Final     BILIRUBIN,TOTAL 10/06/2017 1.5* 0.3 - 1.0 mg/dL Final     ALK PHOSPHATASE 10/06/2017 111* 34 - 104 IU/L Final     ALT (SGPT) 10/06/2017 5* 7 - 52 IU/L Final     AST (SGOT) 10/06/2017 20  13 - 39 IU/L Final     WHITE BLOOD COUNT         10/06/2017 9.1  4.5 - 11.0 thou/cu mm Final     RED BLOOD COUNT           10/06/2017 5.14  4.30 - 5.90 mil/cu mm Final     HEMOGLOBIN                10/06/2017 15.7  13.5 - 17.5 g/dL Final     HEMATOCRIT                10/06/2017 48.0  37.0 - 53.0 % Final     MCV                       10/06/2017 93  80 - 100 fL Final     MCH                       10/06/2017 30.5  26.0 - 34.0 pg Final     MCHC                      10/06/2017 32.7  32.0 - 36.0 g/dL Final     RDW                       10/06/2017 18.6* 11.5 - 15.5 % Final     PLATELET COUNT            10/06/2017 160  140 - 440 thou/cu mm Final     MPV                       10/06/2017 11.8* 6.5 - 11.0 fL Final     NEUTROPHILS               10/06/2017 75.7* 42.0 - 72.0 % Final     LYMPHOCYTES               10/06/2017 13.0* 20.0 - 44.0 % Final     MONOCYTES                 10/06/2017 10.0  <12.0 % Final     EOSINOPHILS               10/06/2017 0.7  <8.0 % Final     BASOPHILS                 10/06/2017 0.4  <3.0 % Final     IMMATURE GRANULOCYTES(METAS,MYELOS* 10/06/2017 0.2  % Final      ABSOLUTE NEUTROPHILS      10/06/2017 6.9  1.7 - 7.0 thou/cu mm Final     ABSOLUTE LYMPHOCYTES      10/06/2017 1.2  0.9 - 2.9 thou/cu mm Final     ABSOLUTE MONOCYTES        10/06/2017 0.9* <0.9 thou/cu mm Final     ABSOLUTE EOSINOPHILS      10/06/2017 0.1  <0.5 thou/cu mm Final     ABSOLUTE BASOPHILS        10/06/2017 0.0  <0.3 thou/cu mm Final     ABSOLUTE IMMATURE GRANULOCYTES(MET* 10/06/2017 0.0  <=0.3 thou/cu mm Final         ASSESSMENT:  Mr. Meyer is a 77-year-old Amharic/[place]ian gentleman who speaks partial Ghanaian and at the times is difficult to understand. He is being seen for severe systolic heart failure. In addition, he has a history of atrial fibrillation, biventricular heart failure, chronic kidney disease stage III, hypertension, right bundle branch block, and anasarca.      PLAN:  1. Persistent atrial fibrillation (HC).  This is likely the cause of his decreased ejection fraction. As noted, he remains in sinus rhythm based on his EKG today. He is currently controlled on metoprolol 25 mg daily and is on a reduced dose of Xarelto at 15 mg secondary to a reduced creatinine clearance. He is having a stress test in the future and if it negative, he can be started on flecainide at his next visit as he'll be seen in 1 month follow-up. He is asymptomatic with atrial fibrillation.      - AMB CONSULT TO CARDIOLOGY  - EKG 12 LEAD UNIT PERFORMED  - VA ELECTROCARDIOGRAM TRACING    2. Severe systolic congestive heart failure (HC).  Felt to be rate induced. As noted, he is in sinus rhythm today. We'll plan to increase his lisinopril from 2.5 mgs daily to 5 mg daily. We'll also increase his Lasix from 20 mg daily to 40 mg daily. He will have a basic metabolic panel repeated in one to 2 weeks. He will also have a BNP at that time as it was found to be elevated today.      - AMB CONSULT TO CARDIOLOGY  - BNP; Future  - ECHO COMPLETE WO CONTRAST; Future  - BNP; Future  - BNP    3. Biventricular heart failure.  As  noted above.    4. HTN (hypertension).  Currently controlled on Lasix, lisinopril, and metoprolol. His blood pressure today is 110/70 with a heart rate of 72. We'll plan to increase his Lasix to 40 mg daily and lisinopril to 5 mg daily.    5. CKD (chronic kidney disease) stage 3, GFR 30-59 ml/min.  Likely cardiorenal syndrome.      - furosemide (LASIX) 40 mg tablet; Take 1 tablet by mouth every morning.  Dispense: 30 tablet; Refill: 3  - lisinopril (PRINIVIL; ZESTRIL) 5 mg tablet; Take 1 tablet by mouth once daily.  Dispense: 30 tablet; Refill: 3  - BASIC METABOLIC PANEL; Future    6. RBBB (right bundle branch block).      7. Systolic CHF, acute (HC).  As noted above.    - furosemide (LASIX) 40 mg tablet; Take 1 tablet by mouth every morning.  Dispense: 30 tablet; Refill: 3  - lisinopril (PRINIVIL; ZESTRIL) 5 mg tablet; Take 1 tablet by mouth once daily.  Dispense: 30 tablet; Refill: 3    8. Thrombocytopenia (HC).  We'll plan for a CBC with his future labs.          Thank you for allowing me to participate in the care of your patient. Please do not hesitate to contact me if you have any questions.     Dylan López, DO

## 2018-02-13 NOTE — NURSING NOTE
Patient Information     Patient Name MRN Sex Toby Clay [id number] Male 1940      Nursing Note by Xena Morris at 2017  2:45 PM     Author:  Xena Morris Service:  (none) Author Type:  (none)     Filed:  2017  4:01 PM Encounter Date:  2017 Status:  Signed     :  Xena Morris            Performed EKG in clinic per VORB from Dr. López.  Order sent to provider for co-sign.  Xena Morris 2017 4:01 PM

## 2018-02-13 NOTE — OR POSTOP
Patient Information     Patient Name MRN Sex Toby Clay [id number] Male 1940      OR PostOp by Nette Carpenter RN at 2017  2:43 PM     Author:  Nette Carpenter RN Service:  (none) Author Type:  NURS- Registered Nurse     Filed:  2017  2:49 PM Date of Service:  2017  2:43 PM Status:  Signed     :  Nette Carpenter RN (NURS- Registered Nurse)                Data:  Toby Meyer underwent Cardioversion on 2017. Cardioversion was performed at the bedside. The dr moore was present. Rhythm prior to Cardioversion was a fib.    Action:   Cardioversion procedure consent obtained.    Response:  Rhythm post cardioversion wmurphySinus Rhythm teresa  Patient tolerated procedure.

## 2018-02-13 NOTE — TELEPHONE ENCOUNTER
Patient's request for a refill has been approved.  Order entered.   PLATELET COUNT            140 - 440 thou/cu mm 160 [phone]         Prescription refilled per RN Medication Refill Policy.................... Alisson Fabian ....................  2/13/2018   8:25 AM

## 2018-02-13 NOTE — PROCEDURES
Patient Information     Patient Name MRN Sex Toby Clay [id number] Male 1940      Procedures by Ezra Arroyo MD at 2018  1:27 PM     Author:  Ezra Arroyo MD Service:  (none) Author Type:  Physician     Filed:  2018  1:44 PM Date of Service:  2018  1:27 PM Status:  Signed     :  Ezra Arroyo MD (Physician)        Procedures:    1. CARDIOVERSION [[id number] (Custom)]               Procedure Note   Procedure: Cardioversion  Diagnosis: Atrial Fibrillation     Previous records reviewed, including available EKGs and echocardiograms. Diagnosis discussed, clinical treatment options discussed. After an informed discussion, the patient decided to proceed. Consent obtained. Time out was performed.  Anesthesia present to manage airway. Pads and electrodes placed by nursing staff. After the patient was sedate, the defibrillator was changed to synchronize mode. A 200 J synchronized shock was delivered. There was a return to normal sinus rhythm.     Complications: None.     Post-procedure EKG reviewed: sinus  Post-procedure medication changes: no changes    Follow-up in 3-4 weeks in clinic with Dr. López.    Signed, Ezra Arroyo MD  Internal Medicine & Pediatrics  Pager: [phone]

## 2018-02-13 NOTE — H&P
"Patient Information     Patient Name MRN Sex Toby Clay [id number] Male 1940      H&P by Ezra Arroyo MD at 2017  1:08 PM     Author:  Ezra Arroyo MD Service:  (none) Author Type:  Physician     Filed:  2017  1:10 PM Date of Service:  2017  1:08 PM Status:  Signed     :  Ezra Arroyo MD (Physician)            H&P Update:  I have personally reviewed my note from 17.  There have been no intervening changes.  He has been therapeutic on Xarelto for > 3 weeks.    Problem list and home medication list reviewed today.    BP (!) 119/111 (Cuff Size: Adult Regular)  Pulse (!) 129  Temp 99.8  F (37.7  C)  Resp 20  Ht 1.626 m (5' 4\")  Wt 66.7 kg (147 lb)  SpO2 99%  BMI 25.23 kg/m2  Gen: Calm  CV: irregularly irregular, no m/r/g  Pulm: CTAB, no w/r/r  Neuro: A&Ox3    EKG  2017  Study personally reviewed  Atrial fibrillation, rate 138    POTASSIUM (mmol/L)    Date Value   2017 4.5     Assessment:  Atrial fibrillation    Plan:   -- Proceed with synchronized cardioversion    Signed, Ezra Arroyo MD  Internal Medicine & Pediatrics  Pager: [phone]            "

## 2018-02-13 NOTE — OR ANESTHESIA
Patient Information     Patient Name MRN Sex Toby Clay [id number] Male 1940      OR Anesthesia by Wei Rios CRNA at 2017 12:20 PM     Author:  Wei Rios CRNA Service:  (none) Author Type:  NURS- Nurse Anesthetist     Filed:  2017 12:20 PM Date of Service:  2017 12:20 PM Status:  Signed     :  Wei Rios CRNA (NURS- Nurse Anesthetist)            ANESTHESIAPREOP      PREANESTHETIC EXAM    Toby Meyer is a 77 y.o. male    There were no vitals taken for this visit.  There is no height or weight on file to calculate BMI.    ALLERGIES    Review of patient's allergies indicates no known allergies.      PAST MEDICAL HISTORY    Past Medical History:     Diagnosis  Date     Anasarca 2017     Aortic valve sclerosis 6/15/2017    S/p TTE 6/15/2017 [facility]       Ascites 2017    Moderate s/p Renal US       Atrial fibrillation (HC)     chronic anticoag with xarelto      Biventricular heart failure 6/15/2017    S/p TTE 6/15/2017: LVEF 25 % with diffuse hypokinesis; global RV fxn moderately reduced      CKD (chronic kidney disease) stage 3, GFR 30-59 ml/min      Essential hypertension      Pneumonia 2013     RBBB (right bundle branch block) 2017     Systolic CHF, acute (HC) 2013    [facility] EF 20-25%       Systolic CHF, acute (HC) 2017    [facility]      Systolic heart failure (HC)     EF 20-25% echo 13 [facility]      [facility] () 2017     Total bilirubin, elevated 2017       Patient Active Problem List     Diagnosis  Code     Atrial fibrillation with RVR (HC) I48.91     VITALY (acute kidney injury) (HC) N17.9     Systolic CHF, acute (HC) I50.21     Advance care planning Z71.89     Aortic valve sclerosis I35.8     Biventricular heart failure I50.82     Ascites R18.8     Hypermagnesemia E83.41     Total bilirubin, elevated  R17     Thrombocytopenia (HC) D69.6     Elevated troponin I level R74.8     RBBB (right bundle branch block) I45.10     Bruising T14.8XXA     Anasarca R60.1     Atrial fibrillation (HC) I48.91     Mitral valve regurgitation I34.0     Tricuspid valve regurgitation I07.1     CKD (chronic kidney disease) stage 3, GFR 30-59 ml/min N18.3     HTN (hypertension) I10       Family History      Problem  Relation Age of Onset     Good Health Mother      Good Health Father      Good Health Maternal Aunt      Good Health Paternal Aunt      Good Health Paternal Uncle      Good Health Maternal Uncle        Past Surgical History:      Procedure  Laterality Date     CARDIOVERSION  6/21/13    successful       SKIN BIOPSY  9/2013    nose         Major Anesthetic Reactions: none    PMH/PSH Reviewed      History    Smoking Status      Never Smoker   Smokeless Tobacco      Never Used     History    Alcohol Use No       Medications have been reviewed in coordination with proposed intra-procedure medications.      (Not in a hospital admission)    Recent Labs  No results found for this visit on 12/14/17.    NPO Status Noted:  Yes    Airway Class:  2, Dentures intact    ASA Physical Status: 3    ANESTHETIC PLAN    Anesthetic Plan: Mac    The risks, benefits, and alternatives of the procedure were discussed.    Postop Note: Please see the chart for the postop note.    Wei Rios CRNA ....................  12/14/2017   12:20 PM

## 2018-02-13 NOTE — PROGRESS NOTES
Patient Information     Patient Name MRN Sex Toby Clay [id number] Male 1940      Progress Notes by Trista Hennessy RN at 2017  9:50 AM     Author:  Trista Hennessy RN Service:  (none) Author Type:  NURS- Registered Nurse     Filed:  2017  9:52 AM Date of Service:  2017  9:50 AM Status:  Signed     :  Trista Hennessy RN (NURS- Registered Nurse)            Falls Risk Criteria:    Age 65 and older or under age 4        Sensory deficits    Poor vision    Use of ambulatory aides    Impaired judgment    Unable to walk independently    Meets High Risk criteria for falls:  Yes               1.  Do you have dizziness or vertigo?    no                    2.  Do you need help standing or walking?   yes                 3.  Have you fallen within the last 6 months?    yes           4.  Has the patient been fasting?      yes       If any risks are marked Yes, the following interventions are utilized:    Do not leave patient unattended     Assist patient in the dressing room and bathroom    Have ambulatory aides available throughout procedure    Involve patient s family if available

## 2018-02-13 NOTE — OR POSTOP
Patient Information     Patient Name MRN Sex Toby Clay [id number] Male 1940      OR PostOp by Shade Turner RN at 2018  3:28 PM     Author:  Shade Turner RN Service:  (none) Author Type:  NURS- Registered Nurse     Filed:  2018  3:38 PM Date of Service:  2018  3:28 PM Status:  Signed     :  Shade Turner RN (NURS- Registered Nurse)            Elective Cardioversion Note    Data:  Toby Meyer underwent Cardioversion on 2018. Cardioversion was performed at the bedside. The Intensivist, Dr. Arroyo was present. Rhythm prior to Cardioversion was Atrial Fibrillation with Superventricular Rate 90.    Action:   Cardioversion procedure consent obtained.  Patient and/or Family teaching was completed.  Quick combo electrodes placed anterior-posterior  Sedation was given per Anesthesiologist at 12  Trial 1, at 200 Joules was successful    Response:  Rhythm post cardioversion was Sinus Rhythm 40's with frequent PAC's  Patient tolerated procedure.

## 2018-02-13 NOTE — TELEPHONE ENCOUNTER
Patient Information     Patient Name MRN Sex Toby Clay [id number] Male 1940      Telephone Encounter by Trista Hennessy RN at 2017  8:51 AM     Author:  Trista Hennessy RN Service:  (none) Author Type:  NURS- Registered Nurse     Filed:  2017  8:55 AM Encounter Date:  2017 Status:  Signed     :  Trista Hennessy RN (NURS- Registered Nurse)            Called to remind patient of stress test and review instructions. Message left of test time, to hold caffeine, and check in at diagnostics.

## 2018-02-13 NOTE — PROGRESS NOTES
Patient Information     Patient Name MRN Sex Toby Clay [id number] Male 1940      Progress Notes by Dylan López DO at 2018  1:45 PM     Author:  Dylan López DO Service:  (none) Author Type:  PHYS- Osteopathic     Filed:  2018  5:25 PM Encounter Date:  2018 Status:  Signed     :  Dylan López DO (PHYS- Osteopathic)            CARDIOLOGY PROGRESS NOTE   SUBJECTIVE:     Mr. Meyer is a 77-year-old Somali/Nigerien gentleman who speaks partial Nigerien and at the times is difficult to understand. He is being seen for severe systolic heart failure. In addition, he has a history of atrial fibrillation, biventricular heart failure, chronic kidney disease stage III, hypertension, right bundle branch block, and anasarca.    He is being seen in follow-up to visit from 17. He was previously cardioverted and placed on Xarelto and metoprolol. He had an EKG that supported sinus rhythm at his last visit. However today, his EKG doesn't support atrial fibrillation with RVR with rates at 111. He remains asymptomatic with the exception of mild fatigue. He denies palpitations, fluttering, or and irregular heartbeat. The concern remains that his severely reduced ejection fraction of 5-10% is secondary to atrial fibrillation. He denies chest pain, chest tightness, chest discomfort or anything to suggest angina. He had a stress test in 17 which showed a moderate to large inferior infarct with an EF of 20%. There was no evidence for reversible ischemia. He had an echocardiogram completed on 17 which showed an ejection fraction 5%.    He was given the option today of pursuing an ablation with his reduced ejection fraction or considering antiarrhythmics with a repeat cardioversion. He has chosen the initiation of antiarrhythmics with a cardioversion in the future. If this fails, he may be willing to consider an ablation at that time. It was explained to him  that amiodarone can affect his lungs, thyroid, liver, and eyes. He will need to have his liver function and thyroid checked on a yearly basis. Also, he should continue to have his eyes checked yearly. He will need a baseline breathing test to assess his lung capacity. Otherwise, he has no additional complaints.           OBJECTIVE:  /78 (Cuff Site: Right Arm, Position: Sitting, Cuff Size: Adult Regular)  Pulse 72  Wt 62.1 kg (137 lb)  BMI 25.21 kg/m2  GENERAL: Comfortable, no distress    RESPIRATORY: Clear to auscultation bilaterally   CARDIOVASCULAR: Heart: Regular rate and rhythm.  PMI non-displaced.  Normal S1 and S2.  No gallops or other extra sounds. No murmurs.    No other pertinent exam.  ADDITIONAL COMMENTS:  I reviewed the patient's medications (see below):    I reviewed the patient's pertinent clinical laboratory studies (see below):    I reviewed the patient's pertinent imaging studies:      ASSESSMENT:  1. Severely reduced ejection fraction at 5-10%  2. Persistent atrial fibrillation.  3. Chronic kidney disease stage III.  4. Hypertension.  5. Right bundle branch block.  6. Ascending aortic aneurysm measured at 4.2 cm.        PLAN:   1. He was given the option of pursuing an ablation with his reduced ejection fraction versus initiating amiodarone and having a repeat cardioversion in the future which would allow amiodarone to maintain sinus rhythm. He has chosen the latter option.  2. He is to take amiodarone 400 mg twice a day for 10 days and then 200 mg daily thereafter.  3. He will have an AST, ALT, TSH, and BNP checked.  4. He'll be scheduled for baseline PFTs.  5. Suggested he have his eyes checked on a yearly basis.  6. His metoprolol will be refilled.  7. He'll be set up for a cardioversion approximately 2 weeks after initiating amiodarone therapy.  8. He'll have a repeat echocardiogram in March relative to an echo from 11/30/17 which showed an ejection fraction of 5-10%.  9. The   phone was used to complete the conversation as he speaks Belizean.  10. He'll be seen in approximately one to 2 months follow-up.    Dylan López  Recent Labs       12/28/17   1024   SODIUM  138   POTASSIUM  4.5   BUN  24   CREATININE  1.05   GLUCOSE  101       Recent Labs       12/28/17   1024   HGB  12.5 L   WBC  7.6   PLT  115 L     No results for input(s): GLUCOSEMETER in the last 720 hours.    Current Outpatient Prescriptions       Medication  Sig Dispense Refill     amiodarone (CORDARONE) 200 mg tablet Take 1 tablet by mouth once daily. 30 tablet 6     DME Compression stockings. Knee high. 20 mmHg. CHF. Lifelong. 2 Each 11     furosemide (LASIX) 40 mg tablet Take 1 tablet by mouth every morning. 30 tablet 3     lisinopril (PRINIVIL; ZESTRIL) 5 mg tablet Take 1 tablet by mouth once daily. 30 tablet 3     metoprolol succinate (TOPROL XL) 25 mg Sustained-Release tablet Take 1 tablet by mouth once daily. 90 tablet 4     multivitamin (MVI) tablet Take 1 tablet by mouth once daily. Indications: VITAMIN DEFICIENCY PREVENTION       polyethylene glycol (MIRALAX; GLYCOLAX) 17 g powder for solution Take 17 g by mouth once daily if needed for Constipation. 1 box 0     potassium chloride (K-DUR) 20 mEq Extended-Release tablet Take 1 tablet by mouth once daily with a meal. 30 tablet 11     rivaroxaban (XARELTO) 15 mg tab tablet Take 15 mg by mouth once daily with evening meal.       sennosides-docusate, 8.6-50 mg, (SENOKOT S) 8.6-50 mg tablet Take 1 tablet by mouth 2 times daily. 60 tablet 11     No current facility-administered medications for this visit.      Medications have been reviewed by me and are current to the best of my knowledge and ability.

## 2018-02-13 NOTE — OR POSTOP
Patient Information     Patient Name MRN Sex Toby Clay [id number] Male 1940      OR PostOp by Shade Turner RN at 2018  3:13 PM     Author:  Shade Turner RN Service:  (none) Author Type:  NURS- Registered Nurse     Filed:  2018  3:14 PM Date of Service:  2018  3:13 PM Status:  Signed     :  Shade Turner RN (NURS- Registered Nurse)            No dizziness when standing HR to 50 when up BP stable  .Shade Turner RN ....................  2018   3:14 PM

## 2018-02-13 NOTE — OR PREOP
Patient Information     Patient Name MRN Sex Toby Clay [id number] Male 1940      OR PreOp by Shannon Martinez RN at 2018 12:05 PM     Author:  Shannon Martinez RN Service:  (none) Author Type:  NURS- Registered Nurse     Filed:  2018 12:07 PM Date of Service:  2018 12:05 PM Status:  Signed     :  Shannon Martinez RN (NURS- Registered Nurse)            Clari rubin [facility] called and confirmed Marshallese speaking  Keely Min for 2018 meeting in the emergency room entrance at 12noon.    SHANNON MARTINEZ RN ....................  2018   12:07 PM

## 2018-02-13 NOTE — NURSING NOTE
Patient Information     Patient Name MRN Sex Toby Clay [id number] Male 1940      Nursing Note by Kassidy Marshall at 2017  3:30 PM     Author:  Kassidy Marshall  Service:  (none) Author Type:  (none)     Filed:  2017  4:29 PM  Encounter Date:  2017 Status:  Addendum     :  Kassidy Marshall        Related Notes: Original Note by Kassidy Marshall filed at 2017  3:39 PM            Patient presents to clinic for hosp F/U on 17 for CHF.   Language line was used during the entire visit.   Kassidy Marshall LPN ....................  2017   3:30 PM

## 2018-02-13 NOTE — PROGRESS NOTES
Patient Information     Patient Name MRN Sex Toby Clay [id number] Male 1940      Progress Notes by Ezra Arroyo MD at 2017  3:30 PM     Author:  Ezra Arroyo MD Service:  (none) Author Type:  Physician     Filed:  2017  4:34 PM Encounter Date:  2017 Status:  Signed     :  Ezra Arroyo MD (Physician)            Subjective  Toby Meyer is a 77 y.o. male who presents for Hospital Discharge Follow-up, establish primary care. Has moved around between providers. Was recently admitted to [facility] 2017 for acute exacerbation of systolic heart failure. His history was obtained today with the use of a telephone . He says that he saw the doctor in the summer and was told his heart was fine. Cardiology note reviewed via care everywhere from Dr. Dylan Lowery 2017. Ejection fraction was reduced at that time, 25% thought to be driven by atrial fibrillation. Ongoing anticoagulation was recommended. No discussion of cardioversion at that time. His significant other says he's had a cardioversion in the past which last for quite a while. He doesn't think he's ever had an angiogram or a stress test. He's had no lightheadedness or dizziness. He's at [facility] for rehabilitation. He continues on furosemide, metoprolol, lisinopril. Again he has been on Xarelto for anticoagulation for years. The nurse told him to get his left calf checked on.    Allergies: reviewed in EMR  Medications: reviewed in EMR  Problem List/PMH: reviewed in EMR    Social Hx:  Social History     Substance Use Topics       Smoking status: Never Smoker     Smokeless tobacco: Never Used     Alcohol use No     Social History Narrative    Born in [place], has been in the [place] for many years.  Turkmen is his first language.  , Sara.  7 sons and 2 daughters.  Moved to [place], [place] with his wife in 2017 to be closer to his daughter, Ja.   "  Moved from [place], [place] after 6 years and prior to that lived in [place], [place] for 6 years and prior to that [place].  Retired from construction.            I reviewed social history and made relevant updates today.    Family Hx:   Family History      Problem  Relation Age of Onset     Good Health Mother      Good Health Father      Good Health Maternal Aunt      Good Health Paternal Aunt      Good Health Paternal Uncle      Good Health Maternal Uncle        Objective  Vitals: reviewed in EMR.  /68  Pulse 90  Temp 98.3  F (36.8  C) (Tympanic)   Ht 1.6 m (5' 3\")  Wt 65.8 kg (145 lb)  BMI 25.69 kg/m2    Gen: Pleasant male, NAD.  HEENT: MMM, no OP erythema.   Neck: Supple, no JVD, no bruits.  CV: Tachycardic, irregularly irregular  Pulm: CTAB no w/r/r  Neuro: Grossly intact  Msk: No lower extremity edema.  Skin: Erythema of the posterior left calf which is outlined in a black marker. Slight warmth is present.  Psychiatric: Normal affect and insight. Does not appear anxious or depressed.    Diabetes Labs  Lab Results      Component  Value Date/Time    CHOL 128 04/22/2013 04:10 AM    HDL 30 (L) 04/22/2013 04:10 AM    LDLCHOL 83 04/22/2013 04:10 AM    TRIGLYCERIDE 75 04/22/2013 04:10 AM    CREATININE 1.20 12/07/2017 04:28 AM         Assessment    ICD-10-CM    1. Hospital discharge follow-up Z09    2. Persistent atrial fibrillation (HC) I48.1 AMB CONSULT TO CARDIOLOGY      [facility] CARDIAC MPI STRESS TEST      STRESS TEST PHARMACOLOGICAL      CARDIOVERSION   3. Severe systolic congestive heart failure (HC) I50.20 AMB CONSULT TO CARDIOLOGY      [facility] CARDIAC MPI STRESS TEST      STRESS TEST PHARMACOLOGICAL      CARDIOVERSION   4. Cellulitis of left leg L03.116 trimethoprim-sulfamethoxazole, 160-800 mg, (BACTRIM DS) tablet   5. CKD (chronic kidney disease) stage 3, GFR 30-59 ml/min N18.3    6. Right rotator cuff tendinitis M75.81    7. Systolic CHF, acute (HC) I50.21 metoprolol succinate (TOPROL XL) " 25 mg Sustained-Release tablet       Mr. Meyer was recently hospitalized for heart failure exacerbation, appears to be doing well since discharge.  Discharge summary and recommendations for outpatient provider are reviewed. Based on what occurred in the visit today:  Previous medication(s) were discontinued or altered? No  Previous medication(s) were suspended pending consultation? No  New medication(s) started? No    It's possible that his severe systolic heart failure is being triggered by persisting atrial fibrillation. For now I will recommend an increase in his dose of metoprolol. We plan to schedule synchronized cardioversion next week. He's been anticoagulated for well over a month on Xarelto. I also placed a consult with Dr. López.    Plan   -- Expected clinical course discussed   -- Medications and their side effects discussed    Patient Instructions    -- Cardiology consult: Dr. López.   -- Daily weight   -- Call MD if weight increases by 3 lbs in one day or by 5 lb in a week   -- Elevate feet 4x/day above hip   -- Compression stockings AM to PM   -- Low salt diet   -- Limit fluid to 1500 mL/day   -- Bactrim x 10 days for left calf   -- Eat yogurt 1-2 times per day while on antibiotics (and for a few weeks after) to reduce the chances of diarrhea   -- Return in 7-10 days if rash is not resolved   -- Increase metoprolol to 25 mg   -- Schedule cardioversion   -- Schedule stress test   -- Have nursing call Ezra Arroyo MD with lightheaded/dizziness and would reduce lasix dose    Return in about 1 month (around 1/8/2018), or if symptoms worsen or fail to improve.        Signed, Ezra Arroyo MD  Internal Medicine & Pediatrics

## 2018-02-13 NOTE — OR PREOP
Patient Information     Patient Name MRN Sex Toby Clay [id number] Male 1940      OR PreOp by Fior Martinez RN at 2018  4:42 PM     Author:  Fior Martinez RN Service:  (none) Author Type:  NURS- Registered Nurse     Filed:  2018  4:45 PM Date of Service:  2018  4:42 PM Status:  Signed     :  Fior Martinez RN (NURS- Registered Nurse)            Toby Meyer was called on 18  with instructions regarding Cardioversion procedure scheduled for 18 after confirming with Dr. López that it is ok to have the cardioversion scheduled 7 days after starting amniodarone.  Allergy and medication review completed.  Given instructions including medications to take am of procedure, NPO after midnight and need for  post procedure.  All questions answered.    FIOR MARTINEZ RN ....................  2018   4:45 PM

## 2018-02-13 NOTE — NURSING NOTE
Patient Information     Patient Name MRN Sex Toby Clay [id number] Male 1940      Nursing Note by Xena Morris at 2018  1:45 PM     Author:  Xena Morris Service:  (none) Author Type:  (none)     Filed:  2018  2:02 PM Encounter Date:  2018 Status:  Signed     :  Xena Morris            Patient comes in for a follow up weights and medications.  Xena Morris LPN ....................  2018   2:00 PM

## 2018-02-13 NOTE — OR POSTOP
Patient Information     Patient Name MRN Sex Toby Clay [id number] Male 1940      OR PostOp by Shade Turner RN at 2018  3:38 PM     Author:  Shade Turner RN Service:  (none) Author Type:  NURS- Registered Nurse     Filed:  2018  3:39 PM Date of Service:  2018  3:38 PM Status:  Signed     :  Shade Turner RN (NURS- Registered Nurse)            Discharge Note    Data:  Toby Meyer has been discharged home at 15 via wheelchair accompanied by Registered Nurse.      Action:  Written discharge/follow-up instructions were provided to patient. Prescriptions : None.  Belongings sent with patient. Medications from home sent with patient/family: Not Applicable  Equipment none .     Response:  Patient verbalized understanding of discharge instructions, reason for discharge, and necessary follow-up appointments.   Steady on feet at discharge

## 2018-02-13 NOTE — PROGRESS NOTES
Patient Information     Patient Name MRN Sex Toby Clay [id number] Male 1940      Progress Notes by Trista Hennessy RN at 2017 12:08 PM     Author:  Trista Hennessy RN Service:  (none) Author Type:  NURS- Registered Nurse     Filed:  2017 12:28 PM Date of Service:  2017 12:08 PM Status:  Signed     :  Trista Hennessy RN (NURS- Registered Nurse)            0925 The patient arrived over one hour late, due to snow removal on their road, for a Lexiscan Cardiolite stress test. The procedure, risks, and benefits were discussed with the patient and his lady friend, and the consent was signed. A saline lock was started, and the Cardiolite was injected by x-ray. The patient was taken to the Lab for a blood draw, then to the waiting area, to await resting images at 1025.   1125 The patient returned from x-ray, and was prepped for the stress test. Dr. Arroyo arrived. The patient was back in atrial fibrillation with rates . He reviewed his lab results and then the patient was administered the Lexiscan per procedure. The patient felt warm, but it resolved after the injection.  He was coffee and a snack and was taken to x-ray in stable condition, for stress images. The saline lock will be removed by x-ray for proper disposal. Please see the chart for the complete test results.

## 2018-02-13 NOTE — PROCEDURES
Patient Information     Patient Name MRN Sex Toby Clay [id number] Male 1940      Procedures by Ezra Arroyo MD at 2017  1:23 PM     Author:  Ezra Arroyo MD Service:  (none) Author Type:  Physician     Filed:  2017  1:24 PM Date of Service:  2017  1:23 PM Status:  Signed     :  Ezra Arroyo MD (Physician)        Procedures:    1. CARDIOVERSION [[id number] (Custom)]               Procedure Note   Procedure: Cardioversion  Diagnosis: Atrial Fibrillation     Previous records reviewed, including available EKGs and echocardiograms. Diagnosis discussed, clinical treatment options discussed. After an informed discussion, the patient decided to proceed. Consent obtained. Time out was performed.  Anesthesia present to manage airway. Pads and electrodes placed by nursing staff. After the patient was sedate, the defibrillator was changed to synchronize mode. A 200 J synchronized shock was delivered. There was a return to normal sinus rhythm.     Complications: None.     Post-procedure EKG reviewed: sinus  Post-procedure medication changes: no changes    Cardiology consult upcoming as planned.    Signed, Ezra Arroyo MD  Internal Medicine & Pediatrics  Pager: [phone]

## 2018-02-15 ENCOUNTER — HOSPITAL ENCOUNTER (EMERGENCY)
Facility: OTHER | Age: 78
End: 2018-02-16
Attending: EMERGENCY MEDICINE | Admitting: EMERGENCY MEDICINE
Payer: MEDICARE

## 2018-02-15 DIAGNOSIS — I46.9 CARDIAC ARREST (H): ICD-10-CM

## 2018-02-15 PROCEDURE — 99285 EMERGENCY DEPT VISIT HI MDM: CPT | Mod: Z6 | Performed by: EMERGENCY MEDICINE

## 2018-02-15 PROCEDURE — 99285 EMERGENCY DEPT VISIT HI MDM: CPT | Performed by: EMERGENCY MEDICINE

## 2018-02-16 NOTE — PROGRESS NOTES
Dr. Escalante notified of death. Due to patient's extensive cardiac history and other conditions, Dr. Escalante did not request an autopsy.     [facility] notified. Patient ruled out for donation. Referral # [id number]. Spoke with Lilli.

## 2018-02-16 NOTE — ED NOTES
Call made to patient home number.  Sara Hedrick contacted and is not  to patient.  Next of Kin determined to be daughter - Ja Meyer - [phone]. Dr Htahaway talked with daughter and informed that patient is .  Plan for daughter to call back with plan for patients' body.

## 2018-02-16 NOTE — ED NOTES
Call made to family phone number.  No family has presented to facility or called the Emergency room for update on patient.  Unable to contact family.  Busy signal on phone when dialed.

## 2018-02-16 NOTE — ED NOTES
Call made to Ja - daughter.  Pt unable to choose  home.  Pt informed of the 2  homes in [place].  Daughter Ja chooses [facility] at this time.  Name of facility and phone number to [facility] given to Ja.  Daughter Ja verbalizes understanding of plan.

## 2018-02-16 NOTE — ED NOTES
Attempted to call family for 2nd time.  Busy signal when phone is dialed.  Unable to contact family at this time.

## 2018-02-16 NOTE — ED NOTES
Pt comes from home, unwitnessed arrest. Aystole on monitor upon EMS arrival. Epi X5, bicarb amp X1. BG 73. CPR in progress, ASHLEE device on pt and pumping. IV established. ET tube established. Blood and mucus coming out of ET tube.    Lisha Walton RN on 2/15/2018 at 10:35 PM

## 2018-02-16 NOTE — ED NOTES
[facility]  home here to  body.  , Lizandro, informed of patient family contacts and given phone numbers for family.  Medications of patient sent with body.

## 2018-02-16 NOTE — ED PROVIDER NOTES
History     Chief Complaint   Patient presents with     Cardiac Arrest     HPI Comments: It is reported by the ambulance that patient's wife told him patient went to bed to take a nap an hour before he was found to be unresponsive at 2137 tonight at which point an ambulance was called.  Ambulance crew report patient was unresponsive when they arrived and was pulseless and they started CPR and patient was intubated.  There were fair amount of blood product soiling patient's mouth face and ET tube on arrival.  Antoine was placed at the scene and continued until the patient arrived in the emergency room.  His report patient received 4 rounds of epinephrine and one ampule of bicarb.  Despite resuscitation attempts patient has never gained perfusing pulse.     Toby Meyer is a 77 year old male who     Problem List:    There are no active problems to display for this patient.       Past Medical History:    No past medical history on file.    Past Surgical History:    No past surgical history on file.    Family History:    No family history on file.    Social History:  Marital Status:   [2]  Social History   Substance Use Topics     Smoking status: Not on file     Smokeless tobacco: Not on file     Alcohol use Not on file        Medications:      No current outpatient prescriptions on file.      Review of Systems   Respiratory:        Respiratory failure   Cardiovascular:        Cardiac arrest       Physical Exam   Pulse: (!) 0  Resp: (!) 0      Physical Exam   HENT:   Bright red blood soiling nostrils, mouth and ET tube   Eyes:   Pupils are fixed, dilated and not reactive to light   Cardiovascular:   No pulse or cardiac sounds   Pulmonary/Chest:   Intubated and mechanically ventilated.  No respiratory efforts   Abdominal: He exhibits distension.   Neurological:   Not alert or oriented to person, place or situation.  No signs of neurological viability    Skin: There is pallor.       ED Course     Patient has  no signs of life on arrival with a fix-it dilated pupils which are not reactive to light despite continuous resuscitation for approximately 55 minutes.  It is possible to the patient may have had a prolonged downtime as he went to bed to take a nap an hour before he was found to be unresponsive.  Patient was pronounced dead few minutes after he arrived as there was no shockable rhythm or pulse or signs of life.  Patient's family is not yet arrived in the emergency room.     ED Course     Procedures               Critical Care time:  none               Labs Ordered and Resulted from Time of ED Arrival Up to the Time of Departure from the ED - No data to display    Assessments & Plan (with Medical Decision Making)     I have reviewed the nursing notes.    I have reviewed the findings, diagnosis, plan and need for follow up with the patient.       New Prescriptions    No medications on file       Final diagnoses:   Cardiac arrest (H) - Pronounced dead on arrival       2/15/2018   [facility]     Arnie Hathaway MD  02/15/18 [id number]

## 2019-11-02 NOTE — NURSING NOTE
Patient Information     Patient Name MRN Sex Toby Clay [id number] Male 1940      Nursing Note by Xena Morris at 2018  1:45 PM     Author:  Xena Morris Service:  (none) Author Type:  (none)     Filed:  2018  2:18 PM Encounter Date:  2018 Status:  Signed     :  Xena Morris            Telephone Language Link used to communicate with patient for entire office visit.  Performed EKG in clinic per VORB from Dr. López.  Order sent to provider for co-sign.  Xena Morris 2018 2:17 PM              Engaged in work or school/Responsibility to family and others/Supportive social network of family or friends

## 2025-03-10 NOTE — PATIENT INSTRUCTIONS
Patient Information     Patient Name MRN Sex Toby Clay [id number] Male 1940      Patient Instructions by Nayeli Villarreal RN at 2018  1:45 PM     Author:  Nayeli Villarreal RN Service:  (none) Author Type:  NURS- Registered Nurse     Filed:  2018  2:45 PM Encounter Date:  2018 Status:  Signed     :  Nayeli Villarreal RN (NURS- Registered Nurse)            Start amiodarone 400 mg twice a day for 10 days and then 200 mg once daily after that    Cardioversion in 1 - 2 weeks    Echocardiogram in early march    Pulmonary function test has been ordered    Labs today    Please follow-up with cardiology in 1 - 2 months           Applied